# Patient Record
Sex: FEMALE | Race: WHITE | URBAN - METROPOLITAN AREA
[De-identification: names, ages, dates, MRNs, and addresses within clinical notes are randomized per-mention and may not be internally consistent; named-entity substitution may affect disease eponyms.]

---

## 2019-03-14 ENCOUNTER — HISTORICAL (OUTPATIENT)
Dept: ADMINISTRATIVE | Facility: HOSPITAL | Age: 69
End: 2019-03-14

## 2019-03-21 ENCOUNTER — HISTORICAL (OUTPATIENT)
Dept: RADIOLOGY | Facility: HOSPITAL | Age: 69
End: 2019-03-21

## 2019-04-22 LAB
ABS NEUT (OLG): 4.25 X10(3)/MCL (ref 2.1–9.2)
BASOPHILS # BLD AUTO: 0.06 X10(3)/MCL (ref 0–0.2)
BASOPHILS NFR BLD AUTO: 0.7 % (ref 0–1)
BUN SERPL-MCNC: 18 MG/DL (ref 7–18)
CALCIUM SERPL-MCNC: 8.3 MG/DL (ref 8.5–10.1)
CHLORIDE SERPL-SCNC: 107 MMOL/L (ref 98–107)
CO2 SERPL-SCNC: 29 MMOL/L (ref 21–32)
CREAT SERPL-MCNC: 0.75 MG/DL (ref 0.55–1.02)
CREAT/UREA NIT SERPL: 24
EOSINOPHIL # BLD AUTO: 0.38 X10(3)/MCL (ref 0–0.9)
EOSINOPHIL NFR BLD AUTO: 4.6 % (ref 0–6.4)
ERYTHROCYTE [DISTWIDTH] IN BLOOD BY AUTOMATED COUNT: 14.2 % (ref 11.5–17)
GLUCOSE SERPL-MCNC: 94 MG/DL (ref 74–106)
HCT VFR BLD AUTO: 43.5 % (ref 37–47)
HGB BLD-MCNC: 14 GM/DL (ref 12–16)
IMM GRANULOCYTES # BLD AUTO: 0.03 10*3/UL (ref 0–0.02)
IMM GRANULOCYTES NFR BLD AUTO: 0.4 % (ref 0–0.43)
LYMPHOCYTES # BLD AUTO: 2.98 X10(3)/MCL (ref 0.6–4.6)
LYMPHOCYTES NFR BLD AUTO: 36.1 % (ref 16–44)
MCH RBC QN AUTO: 29.9 PG (ref 27–31)
MCHC RBC AUTO-ENTMCNC: 32.2 GM/DL (ref 33–36)
MCV RBC AUTO: 92.9 FL (ref 80–94)
MONOCYTES # BLD AUTO: 0.56 X10(3)/MCL (ref 0.1–1.3)
MONOCYTES NFR BLD AUTO: 6.8 % (ref 4–12.1)
NEUTROPHILS # BLD AUTO: 4.25 X10(3)/MCL (ref 2.1–9.2)
NEUTROPHILS NFR BLD AUTO: 51.4 % (ref 43–73)
NRBC BLD AUTO-RTO: 0 % (ref 0–0.2)
PLATELET # BLD AUTO: 165 X10(3)/MCL (ref 130–400)
PMV BLD AUTO: 11.7 FL (ref 7.4–10.4)
POTASSIUM SERPL-SCNC: 4.3 MMOL/L (ref 3.5–5.1)
RBC # BLD AUTO: 4.68 X10(6)/MCL (ref 4.2–5.4)
SODIUM SERPL-SCNC: 142 MMOL/L (ref 136–145)
WBC # SPEC AUTO: 8.3 X10(3)/MCL (ref 4.5–11.5)

## 2019-04-26 ENCOUNTER — HISTORICAL (OUTPATIENT)
Dept: SURGERY | Facility: HOSPITAL | Age: 69
End: 2019-04-26

## 2020-07-20 ENCOUNTER — HISTORICAL (OUTPATIENT)
Dept: ADMINISTRATIVE | Facility: HOSPITAL | Age: 70
End: 2020-07-20

## 2020-07-24 ENCOUNTER — HISTORICAL (OUTPATIENT)
Dept: RADIOLOGY | Facility: HOSPITAL | Age: 70
End: 2020-07-24

## 2020-07-27 LAB
ABS NEUT (OLG): 5.47 X10(3)/MCL (ref 2.1–9.2)
BASOPHILS # BLD AUTO: 0.06 X10(3)/MCL (ref 0–0.2)
BASOPHILS NFR BLD AUTO: 0.6 % (ref 0–1)
BUN SERPL-MCNC: 22.1 MG/DL (ref 9.8–20.1)
CALCIUM SERPL-MCNC: 9.2 MG/DL (ref 8.4–10.2)
CHLORIDE SERPL-SCNC: 105 MMOL/L (ref 98–107)
CO2 SERPL-SCNC: 26 MMOL/L (ref 23–31)
CREAT SERPL-MCNC: 1.02 MG/DL (ref 0.57–1.11)
CREAT/UREA NIT SERPL: 22
EOSINOPHIL # BLD AUTO: 0.54 X10(3)/MCL (ref 0–0.9)
EOSINOPHIL NFR BLD AUTO: 5.8 % (ref 0–6.4)
ERYTHROCYTE [DISTWIDTH] IN BLOOD BY AUTOMATED COUNT: 14 % (ref 11.5–17)
GLUCOSE SERPL-MCNC: 136 MG/DL (ref 82–115)
HCT VFR BLD AUTO: 40.6 % (ref 37–47)
HGB BLD-MCNC: 12.9 GM/DL (ref 12–16)
IMM GRANULOCYTES # BLD AUTO: 0.04 10*3/UL (ref 0–0.02)
IMM GRANULOCYTES NFR BLD AUTO: 0.4 % (ref 0–0.43)
LYMPHOCYTES # BLD AUTO: 2.66 X10(3)/MCL (ref 0.6–4.6)
LYMPHOCYTES NFR BLD AUTO: 28.3 % (ref 16–44)
MCH RBC QN AUTO: 29.7 PG (ref 27–31)
MCHC RBC AUTO-ENTMCNC: 31.8 GM/DL (ref 33–36)
MCV RBC AUTO: 93.5 FL (ref 80–94)
MONOCYTES # BLD AUTO: 0.62 X10(3)/MCL (ref 0.1–1.3)
MONOCYTES NFR BLD AUTO: 6.6 % (ref 4–12.1)
NEUTROPHILS # BLD AUTO: 5.47 X10(3)/MCL (ref 2.1–9.2)
NEUTROPHILS NFR BLD AUTO: 58.3 % (ref 43–73)
NRBC BLD AUTO-RTO: 0 % (ref 0–0.2)
PLATELET # BLD AUTO: 184 X10(3)/MCL (ref 130–400)
PMV BLD AUTO: 11.5 FL (ref 7.4–10.4)
POTASSIUM SERPL-SCNC: 4.1 MMOL/L (ref 3.5–5.1)
RBC # BLD AUTO: 4.34 X10(6)/MCL (ref 4.2–5.4)
SODIUM SERPL-SCNC: 142 MMOL/L (ref 136–145)
WBC # SPEC AUTO: 9.4 X10(3)/MCL (ref 4.5–11.5)

## 2020-07-31 ENCOUNTER — HISTORICAL (OUTPATIENT)
Dept: SURGERY | Facility: HOSPITAL | Age: 70
End: 2020-07-31

## 2020-10-01 ENCOUNTER — HISTORICAL (OUTPATIENT)
Dept: ADMINISTRATIVE | Facility: HOSPITAL | Age: 70
End: 2020-10-01

## 2022-04-11 ENCOUNTER — HISTORICAL (OUTPATIENT)
Dept: ADMINISTRATIVE | Facility: HOSPITAL | Age: 72
End: 2022-04-11

## 2022-04-27 VITALS
BODY MASS INDEX: 44.18 KG/M2 | WEIGHT: 240.06 LBS | DIASTOLIC BLOOD PRESSURE: 85 MMHG | SYSTOLIC BLOOD PRESSURE: 140 MMHG | HEIGHT: 62 IN

## 2022-04-30 NOTE — OP NOTE
DATE OF SURGERY:    04/26/2019    SURGEON:  Erasto Gresham MD    PREOPERATIVE DIAGNOSIS:  Right knee lateral meniscus tear, bucket handle.    POSTOPERATIVE DIAGNOSIS:  Right knee lateral meniscus tear, bucket handle.    PROCEDURE:  Right knee arthroscopy with lateral meniscectomy, bucket-handle tear.    ANESTHESIA:  LMA.    IV FLUIDS:  As per Anesthesia.    ESTIMATED BLOOD LOSS:  Less than 10 cc.    COUNTS:  Correct.    COMPLICATIONS:  None, stable to PACU.    INDICATIONS FOR PROCEDURE:  Ms. Hardy is a 69-year-old female who has been followed in my clinic.  She has failed multiple conservative treatments for her locking of her right knee.  The patient had a complete lock her knee multiple times, multiple episodes.  She did have her MRI demonstrating the above findings.  The risks, benefits, and alternatives were discussed with the patient in detail.  The risks included, but were not limited to, pain, bleeding, infection, damage to blood vessels or nerves, heart attack, stroke, death, need for operation, continued pain, continued loss of motion, retear, post-traumatic arthritis, need for additional surgery.  The patient understood these risks and wanted to surgical intervention.  Informed consent was obtained.    PROCEDURE IN DETAIL:  The patient was found in preoperative holding by Anesthesia and found fit for surgery.  She was taken to the operating room and placed on the operating table in supine position.  All bony prominences were well padded.  Time-out verified correct patient, correct procedure, and correct site, and all were in agreement.  The patient underwent LMA anesthesia without complications.  She was then prepped and draped in normal sterile fashion leaving the right lower extremity exposed for surgery.  A well-padded tourniquet was placed on her right upper thigh.  Approximate tourniquet time was 25 minutes at 300.  She received her preoperative antibiotics.  After exsanguination of right  lower extremity, tourniquet was inflated.  Anterolateral portal was then made through a 1 cm incision.  Camera was introduced in the suprapatellar pouch.  Next, the medial and lateral gutters were inspected.  No loose bodies.  Patella tracked appropriately.  Camera was introduced in the medial compartment.  Anteromedial portal was then made.  Probing of the medial meniscus demonstrated no obvious tears.  She had some mild cartilage changes of the medial compartment.  Camera was introduced into the intracondylar notch.  ACL and PCL were inspected and found to be intact.  She had some mild patellofemoral changes.  Next, camera was introduced into the lateral compartment.  The patient had complete instability of the lateral meniscus.  She had a tear of the posterior horn involving the mid body as well.  It was completely unstable consistent with a bucket handle tear.  With use of 3.5 shaver and straight biter, patient's lateral meniscus was then removed.  The remaining meniscus was then probed and found to be stable without any signs of additional tear or instability.  She had some cartilage changes of the lateral compartment.  The camera was introduced back in the suprapatellar pouch.  All excess fluid was removed.  Tourniquet was released.  Hemostasis was achieved.  Copious irrigation was used to wash the 2 incisions.  The incisions were then closed with 2-0 nylon sutures in standard horizontal mattress configuration.  Xeroform, 4 x 4's, soft tissue dressing, Ace wrap were placed in the right lower extremity.  She was awoken from anesthesia and brought to the PACU in stable condition.        ______________________________  MD DANNY Farris/IRIS  DD:  04/26/2019  Time:  11:12AM  DT:  04/26/2019  Time:  11:46AM  Job #:  [id number]

## 2022-04-30 NOTE — OP NOTE
DATE OF SURGERY:    07/31/2020    SURGEON:  Erasto Gresham MD    PREOPERATIVE DIAGNOSIS:  Left knee medial meniscus tear.    POSTOPERATIVE DIAGNOSIS:  Left knee medial meniscus tear, lateral meniscus tear.    OPERATIVE PROCEDURE:  Left knee arthroscopy, with medial and lateral meniscectomy.    ANESTHESIA:  LMA.    IV FLUIDS:  See Anesthesia.    ESTIMATED BLOOD LOSS:  Less than 5 cc.    INSTRUMENT COUNT:  Correct.    COMPLICATIONS:  None.    CONDITION:  Stable to PACU.    INDICATIONS FOR PROCEDURE:  Ms Hardy is a 70-year-old female with continued painful popping of her left knee after a twisting event.  She has failed conservative treatment.  She had an MRI demonstrating the above findings.  The risks, benefits, and alternatives were discussed with the patient in detail.  All questions were answered.  Informed consent was obtained.    PROCEDURE IN DETAIL:  Patient was found in preoperative holding by Anesthesia and found fit for surgery.  She was taken to the operating room, placed on the operating table in supine position.  All bony prominences were well padded.  Time out called in the room to identify correct patient, correct procedure, and correct site.  All were in agreement.  The patient underwent LMA anesthesia without complications.  She was then prepped and draped in the normal sterile fashion, leaving the left lower extremity exposed for surgery.  A well padded tourniquet was placed on left upper thigh.  Approximate tourniquet time was 20 minutes at 250.  She received preoperative antibiotics.  After exsanguination of the left lower extremity, tourniquet was inflated.  Anterior lateral portal was then made through a 1 cm incision.  Camera was introduced in the suprapatellar pouch.  Next, the medial and lateral gutters were inspected and no loose bodies.  Camera was introduced in the medial compartment.  Anteromedial portal was then made through a 1 cm incision.  Next, probing the medial meniscus  demonstrated a large, unstable medial meniscus tear and a long medial posterior body and horn.  With the use of a straight biter and 3.5 shaver, the patient underwent a partial medial meniscectomy.  The remaining meniscus was then probed and found to be stable without any signs of additional tear or instability.  She had some cartilage changes of the medial compartment, which underwent a limited debridement of the medial femoral condyle.  The camera was introduced in the intercondylar notch where the patient had a redundant fat pad, which also was debrided with the 3.5 shaver.  The ACL and PCL were inspected and found to be intact.  The camera was introduced in the lateral compartment.  The patient also had a lateral meniscus tear.  It was unstable to probing along the posterior 3rd of the body and mid body of the lateral meniscus.  With the use of a straight biter and 3.5 shaver, patient also underwent a lateral meniscectomy, partial.  The remaining lateral meniscus, after this, was then probed and found to be stable without any signs of additional tear or instability.  The camera was introduced back in the suprapatellar pouch.  All excess fluid was removed.  Tourniquet was released.  Hemostasis achieved.  Copious irrigation was used to wash the 2 incisions.  The incisions were then closed with 2-0 nylon sutures in standard horizontal mattress configuration.  Xeroform, Webril, 4 x 4's, soft tissue dressing, Ace wrap was placed on the left lower extremity.  She was awakened from anesthesia and brought to PACU in stable condition.        ______________________________  MD DANNY Farris/BREANA  DD:  08/04/2020  Time:  01:11PM  DT:  08/04/2020  Time:  01:28PM  Job #:  [id number]

## 2022-05-05 NOTE — HISTORICAL [facility] CERNER
This is a historical note converted from [employer]. Formatting and pictures may have been removed.  Please reference [employer] for original formatting and attached multimedia. Chief Complaint  left knee discomfort and slight swelling for approx 1 week. uses ice for swelling and discomfort. Pt states her left knee will fell warm to touch at times. does not recall injury. ? 7.31.2020 left knee ats with medial and lat. menis.  History of Present Illness  Patient returns today for repeat exam. ?Patient is 2-1/2 months from her left knee arthroscopy. ?She states in the last week she has noticed a return of her pain, she states it could be coming from her back, pain from her hip area down into her foot. ?She states that is also on the outside part of her knee. ?She is tried some rest medication?with mild?relief. ?She also has occasional swelling at the end of the day in her ankle.  Physical Exam  Vitals & Measurements  T:?97.9? ?F (Oral)? HR:?76(Peripheral)? BP:?140/85?  HT:?157.00?cm? WT:?108.900?kg? BMI:?44.18?  Left lower extremity compartment soft and warm. ?Skin is intact. ?There are no signs or symptoms of DVT or infection her incision well-healed mild effusion her motion is 0 to 110 degrees she is stable to stressing negative Murrays?she is neurovascular tact distally. ?X-rays 3 views left knee demonstrates joint space narrowing  Assessment/Plan  1.?Osteoarthritis of left knee?M17.12  ?At this time we discussed her physical exam and x-ray findings. ?I do not suspect a re-tear of her?meniscus, though she does have some underlying arthritis. ?We have also discussed possible contribution from her lower back, she will follow-up with her back doctor. ?In regards to her knee?under sterile technique she tolerated a steroid injection very well to the anterolateral part of her left knee. ?This was 2 cc of dexamethasone 3 cc of 2 sent lidocaine without.? We have I have reviewed some knee exercises. ?We have also discussed a  compression sleeve for her?intermittent swelling. ?Patient states she will call return sooner if there is any new problems or difficulties.  Ordered:  asp/inj jnt/bursa, major [id number] PC, 10/01/20 10:51:00 CDT, [facility]ics Clinic, Routine, 10/01/20 10:51:00 CDT, Osteoarthritis of left knee  Durable Medical Equipment, 10/01/20 10:51:00 CDT, compression stocking, BLE- medium compression knee high, Osteoarthritis of left knee  Post-Op follow-up visit [id number] PC, Osteoarthritis of left knee, Orthopaedics Clinic, 10/01/20 10:50:00 CDT  ?   Problem List/Past Medical History  Ongoing  HTN - Hypertension  Hyperlipidemia  Morbid obesity  Rheumatoid arthritis  Historical  Hypertension  Thyroid cancer  Procedure/Surgical History  Arthroscopy Knee W/Menisectomy (Left) (07/31/2020)  Arthroscopy, knee, surgical; with meniscectomy (medial AND lateral, including any meniscal shaving) including debridement/shaving of articular cartilage (chondroplasty), same or separate compartment(s), when performed (07/31/2020)  Excision of Left Knee Joint, Percutaneous Endoscopic Approach (07/31/2020)  Excision of Left Knee Joint, Percutaneous Endoscopic Approach (07/31/2020)  Arthroscopy Knee (Right) (04/26/2019)  Arthroscopy, knee, surgical; with meniscectomy (medial OR lateral, including any meniscal shaving) including debridement/shaving of articular cartilage (chondroplasty), same or separate compartment(s), when performed (04/26/2019)  Excision of Right Knee Joint, Percutaneous Endoscopic Approach (04/26/2019)  Screening mammography, bilateral (2-view study of each breast), including computer-aided detection (CAD) when performed (12/19/2018)  Screening digital breast tomosynthesis, bilateral (List separately in addition to code for primary procedure) (12/13/2017)  ID SCREENINGMAMMOGRAPHYDIGITALXR MAMMO SCREENING DIG (12/07/2016)  ID SCREENINGMAMMOGRAPHYDIGITALXR MAMMO SCREENING DIG (12/03/2015)  Meniscal repair (2007)  cataract sx  thyroid  removal   Medications  alendronate 70 mg oral tablet  azithromycin 250 mg oral tablet,? ?Not Taking, Completed Rx: Pt no longer taking Last Dose Date/Time Unknown  lovastatin 40 mg oral tablet, 40 mg= 1 tab(s), Oral, Daily  meloxicam 15 mg oral tablet, 15 mg= 1 tab(s), Oral, Daily  metoprolol tartrate 50 mg oral tab, 50 mg= 1 tab(s), Oral, BID  nabumetone 500 mg oral tablet, 500 mg= 1 tab(s), Oral, BID,? ?Not taking: patient states only takes once a day; sometimes BID if needed  Norco 5 mg-325 mg oral tablet, 1 tab(s), Oral, q6hr, PRN,? ?Not taking  omeprazole 40 mg oral DR capsule, 40 mg= 1 cap(s), Oral, Daily  prednisoLONE acetate 1% ophthalmic suspension,? ?Not taking: Pt no longer taking Last Dose Date/Time Unknown  Unithroid 200 mcg (0.2 mg) oral tablet, 200 mcg= 1 tab(s), Oral, qMon-Sa  Zofran 4 mg oral tablet, 4 mg= 1 tab(s), Oral, q12hr,? ?Not taking: Pt never took meds. Last Dose Date/Time Unknown  Zofran 4 mg oral tablet, 4 mg= 1 tab(s), Oral, q8hr,? ?Not taking  Allergies  Hydroxychloroquine Sulfate?(hives, Rash)  Social History  Abuse/Neglect  No, No, Yes, 10/01/2020  Alcohol  Never, 03/14/2019  Employment/School  Retired, 07/28/2020  Home/Environment  Lives with Spouse., 07/28/2020  Nutrition/Health  Regular, 07/28/2020  Substance Use  Never, 03/14/2019  Tobacco  Never (less than 100 in lifetime), N/A, 10/01/2020  Family History  Diabetes mellitus type 1.: Sister.  Health Maintenance  Health Maintenance  ???Pending?(in the next year)  ??? ??OverDue  ??? ? ? ?Cognitive Screening due??01/02/20??and every 1??year(s)  ??? ? ? ?Depression Screening due??03/24/20??and every 1??year(s)  ??? ??Due?  ??? ? ? ?ADL Screening due??10/01/20??and every 1??year(s)  ??? ? ? ?Aspirin Therapy for CVD Prevention due??10/01/20??and every 1??year(s)  ??? ? ? ?Bone Density Screening due??10/01/20??Variable frequency  ??? ? ? ?Colorectal Screening due??10/01/20??and every?  ??? ? ? ?Hypertension Management-Education  due??10/01/20??and every 1??year(s)  ??? ? ? ?Influenza Vaccine due??10/01/20??and every?  ??? ? ? ?Medicare Annual Wellness Exam due??10/01/20??and every 1??year(s)  ??? ? ? ?Pneumococcal Vaccine due??10/01/20??and every?  ??? ? ? ?Tetanus Vaccine due??10/01/20??and every 10??year(s)  ??? ? ? ?Zoster Vaccine due??10/01/20??and every?  ??? ??Due In Future?  ??? ? ? ?Breast Cancer Screening not due until??12/18/20??and every 2??year(s)  ??? ? ? ?Obesity Screening not due until??01/01/21??and every 1??year(s)  ??? ? ? ?Advance Directive not due until??01/02/21??and every 1??year(s)  ??? ? ? ?Alcohol Misuse Screening not due until??01/02/21??and every 1??year(s)  ??? ? ? ?Fall Risk Assessment not due until??01/02/21??and every 1??year(s)  ??? ? ? ?Functional Assessment not due until??01/02/21??and every 1??year(s)  ??? ? ? ?Hypertension Management-BMP not due until??07/27/21??and every 1??year(s)  ???Satisfied?(in the past 1 year)  ??? ??Satisfied?  ??? ? ? ?Advance Directive on??07/31/20.??Satisfied by Brenda Zayas RN  ??? ? ? ?Alcohol Misuse Screening on??07/20/20.??Satisfied by Deidra Fay LPN  ??? ? ? ?Blood Pressure Screening on??10/01/20.??Satisfied by Deidra Fay LPN  ??? ? ? ?Body Mass Index Check on??10/01/20.??Satisfied by Sumeet LPN, Deidra M.  ??? ? ? ?Diabetes Screening on??07/27/20.??Satisfied by Ivonne Savage  ??? ? ? ?Fall Risk Assessment on??10/01/20.??Satisfied by Deidra Fay LPN  ??? ? ? ?Functional Assessment on??07/31/20.??Satisfied by Brenda Zayas RN  ??? ? ? ?Hypertension Management-Blood Pressure on??10/01/20.??Satisfied by Deidra Fay LPN  ??? ? ? ?Obesity Screening on??10/01/20.??Satisfied by Deidra Fay LPN  ?

## 2022-05-05 NOTE — HISTORICAL [facility] C[facility]ER
This is a historical note converted from [employer]. Formatting and pictures may have been removed.  Please reference [employer] for original formatting and attached multimedia. Chief Complaint  left knee pain for a few weeks. she states she stopped herself from falling--right knee pain since this happened. Applied heating pad for a few days and also applied biofreeze bid--did not help with her pain.  History of Present Illness  Patient comes in today complaint of left knee pain. ?Patient states she tripped and fell, and twisted her left knee. ?She states it feels exact same from her right knee injury, history of meniscus tear. ?She is tried rest medication some therapy exercises?over the last couple weeks and has not returned?to normal. ?She continues to have some popping in her knee.  Physical Exam  Vitals & Measurements  T:?97.1? ?F (Oral)? HR:?78(Peripheral)? BP:?140/90?  HT:?157?cm? WT:?104.4?kg? BMI:?42.35?  Left lower extremity compartments are soft and warm. ?Skin is intact. ?There is no signs or symptoms of DVT or infection. ?She is point tender along the medial joint line there is a catch with flexion extension.? Her motion is 5 to 120 degrees she is stable to stressing she does walk antalgic gait she is neurovascular tact distally. ?X-rays 3 views left knee?demonstrates minimal joint space narrowing.  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 ?Patient likely has a meniscus tear,?left knee medial meniscus. ?She has failed conservative treatment. ?Under sterile technique she tolerated the steroid injection very well to the anterolateral part of her left knee.? This was 2 cc of dexamethasone 3 cc of 2% lidocaine without. ?She tolerated this very well. ?We will proceed with an MRI of her left knee. ?I will call her with her results.  ?  2.?Acute medial meniscus tear of left knee?S83.587A  Ordered:  dexamethasone, 8 mg, Intra-Articular, Once, first dose  07/20/20 10:00:00 CDT, stop date 07/20/20 10:00:00 CDT  asp/inj jnt/bursa, major [id number] PC, 07/20/20 9:46:00 CDT, Orthopaedics Clinic, Routine, 07/20/20 9:46:00 CDT, Acute medial meniscus tear of left knee  MRI Ext Lower Joint Left W/O Contrast, Routine, 07/20/20 9:46:00 CDT, Other (please specify), Meniscus tear, None, Stretcher, Patient Has IV?, Rad Type, Order for future visit, Acute medial meniscus tear of left knee, Schedule this test, [facility], 07/20/20 9:4...  Office/Outpatient Visit Level 3 Established [id number] PC, Acute medial meniscus tear of left knee, Orthopaedics Clinic, 07/20/20 9:46:00 CDT  ?  Orders:  XR Knee Left 4 or More Views, Routine, 07/20/20 9:29:00 CDT, pain, None, Ambulatory, Patient Has IV?, Rad Type, Left knee pain, Not Scheduled, 07/20/20 9:29:00 CDT   Problem List/Past Medical History  Ongoing  Hyperlipidemia  Morbid obesity  Rheumatoid arthritis  Historical  Hypertension  Thyroid cancer  Procedure/Surgical History  Arthroscopy Knee (Right) (04/26/2019)  Arthroscopy, knee, surgical; with meniscectomy (medial OR lateral, including any meniscal shaving) including debridement/shaving of articular cartilage (chondroplasty), same or separate compartment(s), when performed (04/26/2019)  Excision of Right Knee Joint, Percutaneous Endoscopic Approach (04/26/2019)  Screening mammography, bilateral (2-view study of each breast), including computer-aided detection (CAD) when performed (12/19/2018)  Screening digital breast tomosynthesis, bilateral (List separately in addition to code for primary procedure) (12/13/2017)  RI SCREENINGMAMMOGRAPHYDIGITALXR MAMMO SCREENING DIG (12/07/2016)  RI SCREENINGMAMMOGRAPHYDIGITALXR MAMMO SCREENING DIG (12/03/2015)  Meniscal repair (2007)  cataract sx  thyroid removal   Medications  alendronate 70 mg oral tablet  azithromycin 250 mg oral tablet,? ?Not Taking, Completed Rx: Pt no longer taking Last Dose Date/Time  Unknown  dexamethasone, 8 mg, Intra-Articular, Once  lovastatin 40 mg oral tablet, 40 mg= 1 tab(s), Oral, Daily  meloxicam 15 mg oral tablet, 15 mg= 1 tab(s), Oral, Daily  metoprolol tartrate 50 mg oral tab, 50 mg= 1 tab(s), Oral, BID  nabumetone 500 mg oral tablet, 500 mg= 1 tab(s), Oral, BID,? ?Still taking, not as prescribed: patient states only takes once a day; sometimes BID if needed  Norco 5 mg-325 mg oral tablet, 1 tab(s), Oral, q6hr, PRN,? ?Not Taking, Completed Rx: Pt no longer taking Last Dose Date/Time Unknown  omeprazole 40 mg oral DR capsule, 40 mg= 1 cap(s), Oral, Daily  prednisoLONE acetate 1% ophthalmic suspension,? ?Not taking: Pt no longer taking Last Dose Date/Time Unknown  prednisONE 10 mg oral tablet, 10 mg= 1 tab(s), Oral, BID,? ?Not Taking, Completed Rx: Pt no longer taking Last Dose Date/Time Unknown  Unithroid 200 mcg (0.2 mg) oral tablet, 200 mcg= 1 tab(s), Oral, qMon-Sa  Zofran 4 mg oral tablet, 4 mg= 1 tab(s), Oral, q12hr,? ?Not taking: Pt never took meds. Last Dose Date/Time Unknown  Allergies  Hydroxychloroquine Sulfate?(hives, Rash)  Social History  Abuse/Neglect  No, No, Yes, 07/20/2020  Alcohol  Never, 03/14/2019  Substance Use  Never, 03/14/2019  Tobacco  Never (less than 100 in lifetime), N/A, 07/20/2020  Never (less than 100 in lifetime), N/A, 06/06/2019  Family History  Diabetes mellitus type 1.: Sister.  Health Maintenance  Health Maintenance  ???Pending?(in the next year)  ??? ??OverDue  ??? ? ? ?Advance Directive due??01/02/20??and every 1??year(s)  ??? ? ? ?Cognitive Screening due??01/02/20??and every 1??year(s)  ??? ? ? ?Depression Screening due??03/24/20??and every 1??year(s)  ??? ??Due?  ??? ? ? ?ADL Screening due??07/20/20??and every 1??year(s)  ??? ? ? ?Aspirin Therapy for CVD Prevention due??07/20/20??and every 1??year(s)  ??? ? ? ?Bone Density Screening due??07/20/20??Variable frequency  ??? ? ? ?Colorectal Screening due??07/20/20??and every?  ??? ? ? ?Influenza  Vaccine due??07/20/20??and every?  ??? ? ? ?Medicare Annual Wellness Exam due??07/20/20??and every 1??year(s)  ??? ? ? ?Pneumococcal Vaccine due??07/20/20??and every?  ??? ? ? ?Tetanus Vaccine due??07/20/20??and every 10??year(s)  ??? ? ? ?Zoster Vaccine due??07/20/20??and every?  ??? ??Due In Future?  ??? ? ? ?Breast Cancer Screening not due until??12/18/20??and every 2??year(s)  ??? ? ? ?Obesity Screening not due until??01/01/21??and every 1??year(s)  ??? ? ? ?Alcohol Misuse Screening not due until??01/02/21??and every 1??year(s)  ??? ? ? ?Fall Risk Assessment not due until??01/02/21??and every 1??year(s)  ??? ? ? ?Functional Assessment not due until??01/02/21??and every 1??year(s)  ??? ? ? ?Hypertension Management-BMP not due until??01/16/21??and every 1??year(s)  ???Satisfied?(in the past 1 year)  ??? ??Satisfied?  ??? ? ? ?Alcohol Misuse Screening on??07/20/20.??Satisfied by Deidra Fay LPN M.  ??? ? ? ?Blood Pressure Screening on??07/20/20.??Satisfied by Sumeet MARAVILLA Deidra M.  ??? ? ? ?Body Mass Index Check on??07/20/20.??Satisfied by Sumeet MARAVILLA Deidra M.  ??? ? ? ?Fall Risk Assessment on??07/20/20.??Satisfied by Sumeet MARAVILLA Deidra M.  ??? ? ? ?Functional Assessment on??07/20/20.??Satisfied by Sumeet MARAVILLA Deidra M.  ??? ? ? ?Hypertension Management-Blood Pressure on??07/20/20.??Satisfied by Deidra Fay LPN  ??? ? ? ?Obesity Screening on??07/20/20.??Satisfied by Deidra Fay LPN  ?

## 2022-05-05 NOTE — HISTORICAL [facility] CERNER
This is a historical note converted from [place]. Formatting and pictures may have been removed.  Please reference Jamaal for original formatting and attached multimedia. Chief Complaint  New Patient: Rt knee pops out of place; no injury or treatment. Meniscus Repair x 10 years ago.  History of Present Illness  Patient comes in today for her first visit. ?Patient complains of right knee popping out of place. ?She states more recently when she was on a cruise a couple weeks ago, her knee was caught, she was unable to unlock her knee, very painful and difficult. ?She was eventually able to unlock her knee?and was able to stand on her knee again. ?She did have previous?meniscus repair over 10 years ago.? She denies any numbness or tingling she denies other complaints.  Physical Exam  Vitals & Measurements  HR:?88(Peripheral)? BP:?144/88?  HT:?157?cm? WT:?101?kg? BMI:?40.98?  Patient is well-nourished well-developed female she is awake alert and oriented x3 she is in apparent distress she is pleasant and cooperative. ?Examination of the right lower extremity?compartments soft and warm. ?Skin is intact with no signs or symptoms of DVT or infection. ?She is tender along the medial joint line she does have a positive medial Mc[place]rays. ?There is catching with flexion extension.? Her motion is 0-115 degrees. ?She is stable to stressing, she does walk with a slight hesitant gait she is neurovascular intact distally. ?X-rays 3 views of the right knee?demonstrates?mild?underlying arthritis.  Assessment/Plan  1.?Bucket handle tear of meniscus?S83.202A  ? At this time we discussed her physical exam and x-ray findings.? I suspect her locking is?likely due to a bucket-handle tear of the meniscus.? She remains to have multiple episodes, she does have popping today in clinic. ?We will proceed with an MRI of her right knee.? I would like to see her back next week with her results. ?We have discussed low impact activities in the  meantime.  Ordered:  1125F Pain severity quantified, pain present, 03/14/19 11:02:00 CDT  3008F Body Mass Index (BMI), documented, 03/14/19 11:02:00 CDT  3077F Most recent systolic blood pressure, greater than or equal to 140 mm Hg, 03/14/19 11:02:00 CDT  3079F Most recent diastolic blood pressure, 80 - 89 mm Hg, 03/14/19 11:02:00 CDT  4008F Beta-blocker prescribed or currently being taken, 03/14/19 11:02:00 CDT  4013F Statin therapy prescribed or currently being taken, 03/14/19 11:02:00 CDT  Clinic Follow up, *Est. 03/21/19 3:00:00 CDT, Order for future visit, Bucket handle tear of meniscus  Effusion, right knee  Chondromalacia, [facility]  MRI Ext Lower Joint Right W/O Contrast, Routine, 03/14/19 11:03:00 CDT, Injury, knee & below, None, Ambulatory, mri right knee, Rad Type, Order for future visit, Bucket handle tear of meniscus  Effusion, right knee  Chondromalacia, Schedule this test, [facility]...  Office/Outpatient Visit Level 3 New [id number] PC, Bucket handle tear of meniscus  Effusion, right knee  Chondromalacia, [facility], 03/14/19 11:02:00 CDT  ?  2.?Effusion, right knee?M25.461  Ordered:  1125F Pain severity quantified, pain present, 03/14/19 11:02:00 CDT  3008F Body Mass Index (BMI), documented, 03/14/19 11:02:00 CDT  3077F Most recent systolic blood pressure, greater than or equal to 140 mm Hg, 03/14/19 11:02:00 CDT  3079F Most recent diastolic blood pressure, 80 - 89 mm Hg, 03/14/19 11:02:00 CDT  4008F Beta-blocker prescribed or currently being taken, 03/14/19 11:02:00 CDT  4013F Statin therapy prescribed or currently being taken, 03/14/19 11:02:00 CDT  Clinic Follow up, *Est. 03/21/19 3:00:00 CDT, Order for future visit, Bucket handle tear of meniscus  Effusion, right knee  Chondromalacia, [facility]  MRI Ext Lower Joint Right W/O Contrast, Routine, 03/14/19 11:03:00 CDT, Injury, knee & below, None, Ambulatory, mri right knee, Rad Type, Order for  future visit, Bucket handle tear of meniscus  Effusion, right knee  Chondromalacia, Schedule this test, [facility]...  Office/Outpatient Visit Level 3 New [id number] PC, Bucket handle tear of meniscus  Effusion, right knee  Chondromalacia, [facility], 03/14/19 11:02:00 CDT  ?  3.?Chondromalacia?M94.20  Ordered:  1125F Pain severity quantified, pain present, 03/14/19 11:02:00 CDT  3008F Body Mass Index (BMI), documented, 03/14/19 11:02:00 CDT  3077F Most recent systolic blood pressure, greater than or equal to 140 mm Hg, 03/14/19 11:02:00 CDT  3079F Most recent diastolic blood pressure, 80 - 89 mm Hg, 03/14/19 11:02:00 CDT  4008F Beta-blocker prescribed or currently being taken, 03/14/19 11:02:00 CDT  4013F Statin therapy prescribed or currently being taken, 03/14/19 11:02:00 CDT  Clinic Follow up, *Est. 03/21/19 3:00:00 CDT, Order for future visit, Bucket handle tear of meniscus  Effusion, right knee  Chondromalacia, [facility]  MRI Ext Lower Joint Right W/O Contrast, Routine, 03/14/19 11:03:00 CDT, Injury, knee & below, None, Ambulatory, mri right knee, Rad Type, Order for future visit, Bucket handle tear of meniscus  Effusion, right knee  Chondromalacia, Schedule this test, [facility]...  Office/Outpatient Visit Level 3 New [id number] PC, Bucket handle tear of meniscus  Effusion, right knee  Chondromalacia, [facility], 03/14/19 11:02:00 CDT  ?  Right knee pain?M25.561  ?   Problem List/Past Medical History  Ongoing  Morbid obesity  Historical  Hypertension  Procedure/Surgical History  Meniscal repair (2007)   Medications  alendronate 70 mg oral tablet  lovastatin 40 mg oral tablet, 40 mg= 1 tab(s), Oral, Daily  metoprolol tartrate 50 mg oral tab, 50 mg= 1 tab(s), Oral, BID  nabumetone 500 mg oral tablet, 500 mg= 1 tab(s), Oral, BID  omeprazole 40 mg oral DR capsule, 40 mg= 1 cap(s), Oral, Daily  prednisoLONE acetate 1% ophthalmic  suspension,? ?Not taking: Pt states she no longer takes Rx  Unithroid 200 mcg (0.2 mg) oral tablet, 200 mcg= 1 tab(s), Oral, qAM  Allergies  Hydroxychloroquine Sulfate?(Rash)  Social History  Alcohol  Never, 03/14/2019  Substance Abuse  Never, 03/14/2019  Tobacco  Never (less than 100 in lifetime), N/A, 03/14/2019  Family History  Diabetes mellitus type 1.: Sister.  Health Maintenance  Health Maintenance  ???Pending?(in the next year)  ??? ??Due?  ??? ? ? ?ADL Screening due??03/15/19??and every 1??year(s)  ??? ? ? ?Advance Directive due??03/15/19??and every 1??year(s)  ??? ? ? ?Alcohol Misuse Screening due??03/15/19??and every 1??year(s)  ??? ? ? ?Aspirin Therapy for CVD Prevention due??03/15/19??and every 1??year(s)  ??? ? ? ?Bone Density Screening due??03/15/19??Variable frequency  ??? ? ? ?Breast Cancer Screening ([facility]) due??03/15/19??and every?  ??? ? ? ?Cognitive Screening due??03/15/19??and every 1??year(s)  ??? ? ? ?Colorectal Screening ([facility] Well[facility]) due??03/15/19??and every?  ??? ? ? ?Fall Risk Assessment due??03/15/19??and every 1??year(s)  ??? ? ? ?Functional Assessment due??03/15/19??and every 1??year(s)  ??? ? ? ?Geriatric Depression Screening due??03/15/19??and every 1??year(s)  ??? ? ? ?Pneumococcal Vaccine due??03/15/19??Variable frequency  ??? ? ? ?Pneumococcal Vaccine due??03/15/19??and every?  ??? ? ? ?Tetanus Vaccine due??03/15/19??and every 10??year(s)  ??? ? ? ?Zoster Vaccine due??03/15/19??and every 100??year(s)  ??? ??Due In Future?  ??? ? ? ?Hypertension Management-BMP not due until??12/27/19??and every 1??year(s)  ??? ? ? ?Hypertension Management-Blood Pressure not due until??03/13/20??and every 1??year(s)  ??? ? ? ?Obesity Screening not due until??03/14/20??and every 1??year(s)  ???Satisfied?(in the past 1 year)  ??? ??Satisfied?  ??? ? ? ?Blood Pressure Screening on??03/14/19.??Satisfied by Lynn Cho LPN  ??? ? ? ?Body Mass Index Check on??03/14/19.??Satisfied by Marquis  Lynn MARAVILLA  ??? ? ? ?Obesity Screening on??03/14/19.??Satisfied by Lynn Cho LPN  ?  ?

## 2024-10-09 DIAGNOSIS — M25.572 LEFT ANKLE PAIN: Primary | ICD-10-CM

## 2024-10-18 ENCOUNTER — OFFICE VISIT (OUTPATIENT)
Dept: ORTHOPEDICS | Facility: CLINIC | Age: 74
End: 2024-10-18
Payer: MEDICARE

## 2024-10-18 VITALS
HEIGHT: 62 IN | SYSTOLIC BLOOD PRESSURE: 137 MMHG | BODY MASS INDEX: 44.18 KG/M2 | DIASTOLIC BLOOD PRESSURE: 78 MMHG | HEART RATE: 98 BPM | WEIGHT: 240.06 LBS

## 2024-10-18 DIAGNOSIS — M54.16 LUMBAR RADICULOPATHY: ICD-10-CM

## 2024-10-18 DIAGNOSIS — M25.572 LEFT ANKLE PAIN: ICD-10-CM

## 2024-10-18 DIAGNOSIS — M76.61 RIGHT ACHILLES TENDINITIS: ICD-10-CM

## 2024-10-18 DIAGNOSIS — M19.071 PRIMARY OSTEOARTHRITIS OF RIGHT FOOT: ICD-10-CM

## 2024-10-18 DIAGNOSIS — M25.571 RIGHT ANKLE PAIN, UNSPECIFIED CHRONICITY: Primary | ICD-10-CM

## 2024-10-18 RX ORDER — PHENYLEPHRINE HCL 10 MG
500 TABLET ORAL DAILY
COMMUNITY

## 2024-10-18 RX ORDER — OMEPRAZOLE 40 MG/1
40 CAPSULE, DELAYED RELEASE ORAL
COMMUNITY

## 2024-10-18 RX ORDER — CALCIUM 26/MAGNESIUM 15/ZINC 167MG-83MG
CAPSULE ORAL
COMMUNITY

## 2024-10-18 RX ORDER — METOPROLOL TARTRATE 50 MG/1
50 TABLET ORAL 2 TIMES DAILY
COMMUNITY

## 2024-10-18 RX ORDER — LEVOTHYROXINE SODIUM 200 UG/1
TABLET ORAL
COMMUNITY
Start: 2024-09-05

## 2024-10-18 RX ORDER — METHYLPREDNISOLONE 4 MG/1
TABLET ORAL
Qty: 1 EACH | Refills: 0 | Status: SHIPPED | OUTPATIENT
Start: 2024-10-18

## 2024-10-18 RX ORDER — MULTIVITAMIN
1 TABLET ORAL DAILY
COMMUNITY

## 2024-10-18 RX ORDER — HYDROXYCHLOROQUINE SULFATE 200 MG/1
TABLET, FILM COATED ORAL DAILY
COMMUNITY

## 2024-10-18 RX ORDER — DICLOFENAC SODIUM 50 MG/1
50 TABLET, DELAYED RELEASE ORAL 2 TIMES DAILY PRN
COMMUNITY
Start: 2024-09-24

## 2024-10-18 RX ORDER — NAPROXEN SODIUM 220 MG/1
81 TABLET, FILM COATED ORAL DAILY
COMMUNITY

## 2024-10-18 RX ORDER — ROSUVASTATIN CALCIUM 40 MG/1
40 TABLET, COATED ORAL
COMMUNITY
Start: 2024-08-21

## 2024-10-18 NOTE — PROGRESS NOTES
"Subjective:    CC: Pain of the Right Ankle (Rt ankle pain, has been hurting for awile now, reports increase in pain, states taking otc meds for relief, wbat, ambulates without assistance, unsure what could have caused pain, no other complaints, )       HPI:  Patient comes in today for her 1st visit.  Patient complains of right ankle pain for a longstanding time.  Patient states she has had increasing pain especially when she walks on it.  She has no pain at rest.  She has not improve with conservative treatment, had an MRI.  She is presently with family.  She does have a history of sciatica.  He also has a history of arthritis, taking medication.    ROS: Refer to HPI for pertinent ROS. All other 12 point systems negative.    Objective:  Vitals:    10/18/24 0817   BP: 137/78   Pulse: 98   Weight: 108.9 kg (240 lb 1.3 oz)   Height: 5' 1.81" (1.57 m)        Physical Exam:  Patient is well-nourished developed female she is awake alert and oriented x3 she is in no apparent stress is pleasant and cooperative.  Examination of the right lower extremity compartment soft and warm.  Skin is intact.  There is no signs symptoms of DVT or infection.  She is point tender along the Achilles tendon.  There is no palpable defect, negative Dhillon's exam.  She has 40° of ankle motion stable to stressing, neurovascular intact distally.    Images:  Outside x-rays and MRI were reviewed. Images Reviewed and discussed with patient.    Assessment:  1. Right ankle pain, unspecified chronicity    2. Left ankle pain  - Ambulatory referral/consult to Orthopedics    3. Right Achilles tendinitis    4. Primary osteoarthritis of right foot    5. Lumbar radiculopathy        Plan:  At this time we discussed her physical exam and imaging findings.  We have discussed elevated heel appropriate shoe wear, she was given a pamphlet on ankle range of motion strengthening exercises.  She would like to hold off on formal therapy.  We have discussed a Medrol " Dosepak with appropriate precautions.  I would like see back in 6 weeks to see how she is progressing.    Follow UP: No follow-ups on file.